# Patient Record
Sex: MALE | Race: WHITE | NOT HISPANIC OR LATINO
[De-identification: names, ages, dates, MRNs, and addresses within clinical notes are randomized per-mention and may not be internally consistent; named-entity substitution may affect disease eponyms.]

---

## 2019-06-23 ENCOUNTER — TRANSCRIPTION ENCOUNTER (OUTPATIENT)
Age: 39
End: 2019-06-23

## 2019-10-09 ENCOUNTER — APPOINTMENT (OUTPATIENT)
Dept: PULMONOLOGY | Facility: CLINIC | Age: 39
End: 2019-10-09
Payer: COMMERCIAL

## 2019-10-09 VITALS — WEIGHT: 214 LBS | BODY MASS INDEX: 29.96 KG/M2 | HEIGHT: 71.06 IN

## 2019-10-09 VITALS — OXYGEN SATURATION: 98 % | HEART RATE: 71 BPM | SYSTOLIC BLOOD PRESSURE: 112 MMHG | DIASTOLIC BLOOD PRESSURE: 76 MMHG

## 2019-10-09 DIAGNOSIS — Z80.9 FAMILY HISTORY OF MALIGNANT NEOPLASM, UNSPECIFIED: ICD-10-CM

## 2019-10-09 DIAGNOSIS — Z87.891 PERSONAL HISTORY OF NICOTINE DEPENDENCE: ICD-10-CM

## 2019-10-09 PROBLEM — Z00.00 ENCOUNTER FOR PREVENTIVE HEALTH EXAMINATION: Status: ACTIVE | Noted: 2019-10-09

## 2019-10-09 PROCEDURE — 99205 OFFICE O/P NEW HI 60 MIN: CPT

## 2019-10-09 NOTE — HISTORY OF PRESENT ILLNESS
[Snoring] : snoring [Awakes Unrefreshed] : awakening unrefreshed [Recent  Weight Gain] : recent weight gain [To Bed: ___] : ~he/she~ goes to bed at [unfilled] [Arises: ___] : arises at [unfilled] [Sleep Onset Latency: ___ minutes] : sleep onset latency of [unfilled] minutes reported [Nocturnal Awakenings: ___] : ~he/she~ typically has [unfilled] nocturnal awakenings [Witnessed Apneas] : no witnessed sleep apnea [Frequent Nocturnal Awakening] : no nocturnal awakening [Awakes with Headache] : no headache upon awakening [Awakening With Dry Mouth] : no dry mouth upon awakening [Unusual Sleep Behavior] : no unusual sleep behavior [Hypersomnolence] : no hypersomnolence [Cataplexy] : no cataplexy [Sleep Paralysis] : no sleep paralysis [Hypnagogic Hallucinations] : no hallucinations when falling asleep [Hypnopompic Hallucinations] : no hallucinations when awakening [FreeTextEntry1] : 38-year-old male with no significant past medical history except for childhood reflux seen today for recent findings on CAT scan following a protracted episode of neck pain. Patient states that he developed acute onset neck pain located in his posterior pharynx Approximately 3 months ago. He patient was initially treated with nonsteroidals with gradual improvement and resolution.\par \par Hx neg for fever,chills, N/V night sweats or wt loss. Mild snoring without apneas or EDS\par

## 2019-10-09 NOTE — CONSULT LETTER
[Dear  ___] : Dear  [unfilled], [Consult Letter:] : I had the pleasure of evaluating your patient, [unfilled]. [Please see my note below.] : Please see my note below. [Sincerely,] : Sincerely, [FreeTextEntry3] : Jose Muse MD FCCP\par Pulmonary/Critical Care/Sleep Medicine\par Department of Internal Medicine\par \par [facility]\par

## 2019-10-09 NOTE — PHYSICAL EXAM
[General Appearance - Well Developed] : well developed [Normal Appearance] : normal appearance [Well Groomed] : well groomed [No Deformities] : no deformities [General Appearance - Well Nourished] : well nourished [General Appearance - In No Acute Distress] : no acute distress [Normal Conjunctiva] : the conjunctiva exhibited no abnormalities [Eyelids - No Xanthelasma] : the eyelids demonstrated no xanthelasmas [Normal Oropharynx] : normal oropharynx [Neck Appearance] : the appearance of the neck was normal [Neck Cervical Mass (___cm)] : no neck mass was observed [Jugular Venous Distention Increased] : there was no jugular-venous distention [Thyroid Nodule] : there were no palpable thyroid nodules [Thyroid Diffuse Enlargement] : the thyroid was not enlarged [Heart Rate And Rhythm] : heart rate and rhythm were normal [Heart Sounds] : normal S1 and S2 [Murmurs] : no murmurs present [Respiration, Rhythm And Depth] : normal respiratory rhythm and effort [Exaggerated Use Of Accessory Muscles For Inspiration] : no accessory muscle use [Auscultation Breath Sounds / Voice Sounds] : lungs were clear to auscultation bilaterally [Abdomen Soft] : soft [Abdomen Tenderness] : non-tender [Abdomen Mass (___ Cm)] : no abdominal mass palpated [Abnormal Walk] : normal gait [Gait - Sufficient For Exercise Testing] : the gait was sufficient for exercise testing [Nail Clubbing] : no clubbing of the fingernails [Cyanosis, Localized] : no localized cyanosis [Petechial Hemorrhages (___cm)] : no petechial hemorrhages [Skin Color & Pigmentation] : normal skin color and pigmentation [Skin Turgor] : normal skin turgor [] : no rash [Deep Tendon Reflexes (DTR)] : deep tendon reflexes were 2+ and symmetric [Sensation] : the sensory exam was normal to light touch and pinprick [No Focal Deficits] : no focal deficits

## 2019-10-09 NOTE — PROCEDURE
[FreeTextEntry1] : Neck and chest CTs without contrast were performed at [facility] on 10/3 and 10/4 2019. These were reviewed on PACS with the patient and his wife. These demonstrated prominent mediastinal nodes with several sub-centimeter pulmonary nodules located on the right. Largest mediastinal nodes was read 3.0 x 1.4 x 1.7 cm.

## 2019-10-10 LAB
ALBUMIN SERPL ELPH-MCNC: 4.8 G/DL
ALP BLD-CCNC: 42 U/L
ALT SERPL-CCNC: 45 U/L
ANION GAP SERPL CALC-SCNC: 15 MMOL/L
AST SERPL-CCNC: 29 U/L
BASOPHILS # BLD AUTO: 0.02 K/UL
BASOPHILS NFR BLD AUTO: 0.3 %
BILIRUB SERPL-MCNC: 0.6 MG/DL
BUN SERPL-MCNC: 17 MG/DL
CALCIUM SERPL-MCNC: 9.8 MG/DL
CHLORIDE SERPL-SCNC: 101 MMOL/L
CO2 SERPL-SCNC: 24 MMOL/L
CREAT SERPL-MCNC: 1.13 MG/DL
EBV EA AB SER IA-ACNC: 20.1 U/ML
EBV EA AB TITR SER IF: POSITIVE
EBV EA IGG SER QL IA: 269 U/ML
EBV EA IGG SER-ACNC: POSITIVE
EBV EA IGM SER IA-ACNC: NEGATIVE
EBV PATRN SPEC IB-IMP: NORMAL
EBV VCA IGG SER IA-ACNC: 732 U/ML
EBV VCA IGM SER QL IA: <10 U/ML
EOSINOPHIL # BLD AUTO: 0.15 K/UL
EOSINOPHIL NFR BLD AUTO: 2.3 %
EPSTEIN-BARR VIRUS CAPSID ANTIGEN IGG: POSITIVE
GLUCOSE SERPL-MCNC: 82 MG/DL
HCT VFR BLD CALC: 49 %
HGB BLD-MCNC: 16 G/DL
IMM GRANULOCYTES NFR BLD AUTO: 0.3 %
LYMPHOCYTES # BLD AUTO: 0.88 K/UL
LYMPHOCYTES NFR BLD AUTO: 13.5 %
MAN DIFF?: NORMAL
MCHC RBC-ENTMCNC: 29.7 PG
MCHC RBC-ENTMCNC: 32.7 GM/DL
MCV RBC AUTO: 91.1 FL
MONOCYTES # BLD AUTO: 0.63 K/UL
MONOCYTES NFR BLD AUTO: 9.7 %
NEUTROPHILS # BLD AUTO: 4.8 K/UL
NEUTROPHILS NFR BLD AUTO: 73.9 %
PLATELET # BLD AUTO: 251 K/UL
POTASSIUM SERPL-SCNC: 4.5 MMOL/L
PROT SERPL-MCNC: 7.5 G/DL
RBC # BLD: 5.38 M/UL
RBC # FLD: 12.4 %
SODIUM SERPL-SCNC: 140 MMOL/L
WBC # FLD AUTO: 6.5 K/UL

## 2019-10-11 LAB — HETEROPH AB SER QL: NEGATIVE

## 2019-10-15 LAB — ACE BLD-CCNC: 59 U/L

## 2019-12-23 ENCOUNTER — APPOINTMENT (OUTPATIENT)
Dept: PULMONOLOGY | Facility: CLINIC | Age: 39
End: 2019-12-23

## 2020-03-04 ENCOUNTER — APPOINTMENT (OUTPATIENT)
Dept: PULMONOLOGY | Facility: CLINIC | Age: 40
End: 2020-03-04
Payer: COMMERCIAL

## 2020-03-04 VITALS — HEIGHT: 70 IN | BODY MASS INDEX: 29.78 KG/M2 | WEIGHT: 208 LBS

## 2020-03-04 VITALS
HEART RATE: 72 BPM | DIASTOLIC BLOOD PRESSURE: 68 MMHG | OXYGEN SATURATION: 98 % | SYSTOLIC BLOOD PRESSURE: 122 MMHG | RESPIRATION RATE: 14 BRPM

## 2020-03-04 PROCEDURE — 85018 HEMOGLOBIN: CPT | Mod: QW

## 2020-03-04 PROCEDURE — 94727 GAS DIL/WSHOT DETER LNG VOL: CPT

## 2020-03-04 PROCEDURE — 99215 OFFICE O/P EST HI 40 MIN: CPT | Mod: 25

## 2020-03-04 PROCEDURE — 94010 BREATHING CAPACITY TEST: CPT

## 2020-03-04 PROCEDURE — 94729 DIFFUSING CAPACITY: CPT

## 2020-03-04 NOTE — DISCUSSION/SUMMARY
[FreeTextEntry1] : 39-year-old male, seen today for mediastinal and hilar adenopathy. No significant change or symptoms. Possibly stage I sarcoid versus Julissa-Barr virus infection versus lymphoma. Case discussed with the patient's wife, as well as the patient and he will be referred to thoracic surgery for mediastinoscopy.

## 2020-03-04 NOTE — PHYSICAL EXAM
[No Acute Distress] : no acute distress [Normal Appearance] : normal appearance [No Neck Mass] : no neck mass [Normal Oropharynx] : normal oropharynx [Normal Rate/Rhythm] : normal rate/rhythm [No Murmurs] : no murmurs [Normal S1, S2] : normal s1, s2 [No Abnormalities] : no abnormalities [Clear to Auscultation Bilaterally] : clear to auscultation bilaterally [No Resp Distress] : no resp distress [Benign] : benign [No Clubbing] : no clubbing [Normal Gait] : normal gait [No Edema] : no edema [No Cyanosis] : no cyanosis [FROM] : FROM [Normal Color/ Pigmentation] : normal color/ pigmentation [Normal Affect] : normal affect [No Focal Deficits] : no focal deficits [Oriented x3] : oriented x3

## 2020-03-04 NOTE — HISTORY OF PRESENT ILLNESS
[TextBox_4] : 39-year-old male, seen today for followup of mediastinal and hilar adenopathy. Patient denies any complaints of cough, wheeze, shortness of breath, chest pains, palpitations, lightheadedness, dizziness. He has noticed some posterior neck pain without any associated masses. On his last visit here. He was found to have elevated IgG Julissa-Barr virus titers with a negative IgM. 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 He is seen today for followup of his adenopathy

## 2020-03-04 NOTE — CONSULT LETTER
[Dear  ___] : Dear  [unfilled], [Please see my note below.] : Please see my note below. [Consult Letter:] : I had the pleasure of evaluating your patient, [unfilled]. [Sincerely,] : Sincerely, [FreeTextEntry3] : Jose Muse MD FCCP\par Pulmonary/Critical Care/Sleep Medicine\par Department of Internal Medicine\par \par [facility]\par

## 2020-03-23 ENCOUNTER — APPOINTMENT (OUTPATIENT)
Dept: THORACIC SURGERY | Facility: CLINIC | Age: 40
End: 2020-03-23

## 2020-05-19 ENCOUNTER — APPOINTMENT (OUTPATIENT)
Dept: PULMONOLOGY | Facility: CLINIC | Age: 40
End: 2020-05-19

## 2020-09-01 ENCOUNTER — APPOINTMENT (OUTPATIENT)
Dept: THORACIC SURGERY | Facility: CLINIC | Age: 40
End: 2020-09-01
Payer: COMMERCIAL

## 2020-09-01 VITALS
RESPIRATION RATE: 15 BRPM | TEMPERATURE: 98.3 F | WEIGHT: 208 LBS | DIASTOLIC BLOOD PRESSURE: 73 MMHG | OXYGEN SATURATION: 97 % | BODY MASS INDEX: 29.78 KG/M2 | HEIGHT: 70 IN | SYSTOLIC BLOOD PRESSURE: 121 MMHG | HEART RATE: 60 BPM

## 2020-09-01 PROCEDURE — 99203 OFFICE O/P NEW LOW 30 MIN: CPT

## 2020-09-01 NOTE — ASSESSMENT
[FreeTextEntry1] : Ryan is a 39-year-old male with adenopathy in the mediastinum and small lung nodules suggestive of sarcoidosis. I do believe biopsy is appropriate for confirmation. I've asked him to obtain a PET scan prior to proceeding with bronchoscopy and endobronchial ultrasound.\par \par Thank you for allowing me to participate in the care of your patient.\par \par Bharath Mcknight MD\[facility] Department of Cardiovascular and Thoracic Surgery\par \par [employer]\[facility] [employer] [facility] at [facility]\par

## 2020-09-01 NOTE — PHYSICAL EXAM
[General Appearance - Alert] : alert [General Appearance - In No Acute Distress] : in no acute distress [Neck Appearance] : the appearance of the neck was normal [Neck Cervical Mass (___cm)] : no neck mass was observed [Jugular Venous Distention Increased] : there was no jugular-venous distention [Thyroid Diffuse Enlargement] : the thyroid was not enlarged [Thyroid Nodule] : there were no palpable thyroid nodules [] : no respiratory distress [Auscultation Breath Sounds / Voice Sounds] : lungs were clear to auscultation bilaterally [Heart Rate And Rhythm] : heart rate was normal and rhythm regular [Heart Sounds] : normal S1 and S2 [Heart Sounds Gallop] : no gallops [Murmurs] : no murmurs [Heart Sounds Pericardial Friction Rub] : no pericardial rub [Examination Of The Chest] : the chest was normal in appearance [Chest Visual Inspection Thoracic Asymmetry] : no chest asymmetry [Diminished Respiratory Excursion] : normal chest expansion [No Spinal Tenderness] : no spinal tenderness [No Focal Deficits] : no focal deficits [Oriented To Time, Place, And Person] : oriented to person, place, and time [Impaired Insight] : insight and judgment were intact [Affect] : the affect was normal

## 2020-09-01 NOTE — HISTORY OF PRESENT ILLNESS
[FreeTextEntry1] : \gabrielle Davis was in the office today for an initial visit. He has a history of lymphadenopathy in the mediastinum and neck in association with an Julissa-Barr infection. A recent CT scan demonstrated persistent adenopathy in the mediastinum and hilar regions in association with small lung nodules. He denies fever, chills, night sweats, weight loss, or weight gain.

## 2020-09-22 ENCOUNTER — APPOINTMENT (OUTPATIENT)
Dept: THORACIC SURGERY | Facility: CLINIC | Age: 40
End: 2020-09-22

## 2020-09-24 ENCOUNTER — APPOINTMENT (OUTPATIENT)
Dept: THORACIC SURGERY | Facility: HOSPITAL | Age: 40
End: 2020-09-24

## 2020-10-12 LAB
BASOPHILS # BLD AUTO: 0.02 K/UL
BASOPHILS NFR BLD AUTO: 0.3 %
EOSINOPHIL # BLD AUTO: 0.22 K/UL
EOSINOPHIL NFR BLD AUTO: 3.6 %
HCT VFR BLD CALC: 46.7 %
HGB BLD-MCNC: 15.2 G/DL
IMM GRANULOCYTES NFR BLD AUTO: 0.2 %
INR PPP: 0.98 RATIO
LYMPHOCYTES # BLD AUTO: 1.09 K/UL
LYMPHOCYTES NFR BLD AUTO: 17.7 %
MAN DIFF?: NORMAL
MCHC RBC-ENTMCNC: 29.7 PG
MCHC RBC-ENTMCNC: 32.5 GM/DL
MCV RBC AUTO: 91.2 FL
MONOCYTES # BLD AUTO: 0.69 K/UL
MONOCYTES NFR BLD AUTO: 11.2 %
NEUTROPHILS # BLD AUTO: 4.14 K/UL
NEUTROPHILS NFR BLD AUTO: 67 %
PLATELET # BLD AUTO: 237 K/UL
PT BLD: 11.6 SEC
RBC # BLD: 5.12 M/UL
RBC # FLD: 12.7 %
SARS-COV-2 N GENE NPH QL NAA+PROBE: NOT DETECTED
WBC # FLD AUTO: 6.17 K/UL

## 2020-10-13 ENCOUNTER — RESULT REVIEW (OUTPATIENT)
Age: 40
End: 2020-10-13

## 2020-10-13 ENCOUNTER — OUTPATIENT (OUTPATIENT)
Dept: OUTPATIENT SERVICES | Facility: HOSPITAL | Age: 40
LOS: 1 days | End: 2020-10-13
Payer: COMMERCIAL

## 2020-10-13 ENCOUNTER — APPOINTMENT (OUTPATIENT)
Dept: INTERVENTIONAL RADIOLOGY/VASCULAR | Facility: CLINIC | Age: 40
End: 2020-10-13

## 2020-10-13 ENCOUNTER — LABORATORY RESULT (OUTPATIENT)
Age: 40
End: 2020-10-13

## 2020-10-13 DIAGNOSIS — Z00.8 ENCOUNTER FOR OTHER GENERAL EXAMINATION: ICD-10-CM

## 2020-10-13 PROCEDURE — 38505 NEEDLE BIOPSY LYMPH NODES: CPT

## 2020-10-13 PROCEDURE — 76942 ECHO GUIDE FOR BIOPSY: CPT | Mod: 26

## 2020-10-26 ENCOUNTER — TRANSCRIPTION ENCOUNTER (OUTPATIENT)
Age: 40
End: 2020-10-26

## 2020-11-02 ENCOUNTER — APPOINTMENT (OUTPATIENT)
Dept: THORACIC SURGERY | Facility: CLINIC | Age: 40
End: 2020-11-02
Payer: COMMERCIAL

## 2020-11-02 VITALS
TEMPERATURE: 98.2 F | DIASTOLIC BLOOD PRESSURE: 89 MMHG | WEIGHT: 210 LBS | HEART RATE: 70 BPM | SYSTOLIC BLOOD PRESSURE: 136 MMHG | BODY MASS INDEX: 29.4 KG/M2 | OXYGEN SATURATION: 97 % | HEIGHT: 71 IN | RESPIRATION RATE: 18 BRPM

## 2020-11-02 PROCEDURE — 99214 OFFICE O/P EST MOD 30 MIN: CPT

## 2020-11-02 PROCEDURE — 99072 ADDL SUPL MATRL&STAF TM PHE: CPT

## 2020-11-02 NOTE — ASSESSMENT
[FreeTextEntry1] : Ryan is a 40-year-old male with adenopathy, demonstrating noncaseating granulomas suggestive of sarcoidosis. I've asked him to return for a pulmonary followup for further recommendations. He can see me on a p.r.n. basis going forward.\par \par Thank you for allowing me to participate in the care of your patient.\par \par Bharath Mcknight MD\[facility] Department of Cardiovascular and Thoracic Surgery\par \[facility] [employer]\[facility] at [facility]\par 
no

## 2020-11-02 NOTE — PHYSICAL EXAM
[Neck Appearance] : the appearance of the neck was normal [Neck Cervical Mass (___cm)] : no neck mass was observed [Jugular Venous Distention Increased] : there was no jugular-venous distention [Thyroid Diffuse Enlargement] : the thyroid was not enlarged [Thyroid Nodule] : there were no palpable thyroid nodules [] : no respiratory distress [Auscultation Breath Sounds / Voice Sounds] : lungs were clear to auscultation bilaterally [Heart Rate And Rhythm] : heart rate was normal and rhythm regular [Heart Sounds] : normal S1 and S2 [Heart Sounds Gallop] : no gallops [Murmurs] : no murmurs [Heart Sounds Pericardial Friction Rub] : no pericardial rub [Examination Of The Chest] : the chest was normal in appearance [Chest Visual Inspection Thoracic Asymmetry] : no chest asymmetry [Diminished Respiratory Excursion] : normal chest expansion [No Focal Deficits] : no focal deficits [Oriented To Time, Place, And Person] : oriented to person, place, and time [Impaired Insight] : insight and judgment were intact [Affect] : the affect was normal

## 2020-11-02 NOTE — HISTORY OF PRESENT ILLNESS
[FreeTextEntry1] : \gabrielle Davis was in the office today for a followup visit. He could initially presented with mediastinal adenopathy concerning for sarcoidosis. A PET scan was performed that showed a hypermetabolic groin lymph node and lengthy discussion was had regarding which site. The biopsy. He and his wife chose to proceed with the groin as the initial biopsy site. This demonstrated noncaseating granulomas suggestive of sarcoidosis.

## 2022-01-21 ENCOUNTER — NON-APPOINTMENT (OUTPATIENT)
Age: 42
End: 2022-01-21

## 2022-02-02 ENCOUNTER — APPOINTMENT (OUTPATIENT)
Dept: PULMONOLOGY | Facility: CLINIC | Age: 42
End: 2022-02-02
Payer: COMMERCIAL

## 2022-02-02 VITALS
SYSTOLIC BLOOD PRESSURE: 124 MMHG | OXYGEN SATURATION: 99 % | TEMPERATURE: 96.6 F | DIASTOLIC BLOOD PRESSURE: 84 MMHG | HEART RATE: 82 BPM | HEIGHT: 71 IN | WEIGHT: 215 LBS | BODY MASS INDEX: 30.1 KG/M2

## 2022-02-02 DIAGNOSIS — D86.9 SARCOIDOSIS, UNSPECIFIED: ICD-10-CM

## 2022-02-02 PROCEDURE — 99215 OFFICE O/P EST HI 40 MIN: CPT

## 2022-02-02 NOTE — DISCUSSION/SUMMARY
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 Patient presently asymptomatic. Recent pharyngitis most likely viral but may be secondary to impacted wisdom teeth. Patient has been advised to follow-up with his dentist as well as routine ophthalmologic evaluation.\par \par No evidence of extra pulmonary or extra lymphatic disease. He has been warned of other sites of involvement such as cardiac, liver, spleen, CNS but again is asymptomatic

## 2022-02-02 NOTE — CONSULT LETTER
[Dear  ___] : Dear  [unfilled], [Please see my note below.] : Please see my note below. [Consult Closing:] : Thank you very much for allowing me to participate in the care of this patient.  If you have any questions, please do not hesitate to contact me. [Sincerely,] : Sincerely, [FreeTextEntry3] : Jose Muse MD FCCP\par Pulmonary/Critical Care/Sleep Medicine\par Department of Internal Medicine\par \par [facility]\par

## 2022-02-02 NOTE — HISTORY OF PRESENT ILLNESS
[TextBox_4] : 41-year-old male with a history of mediastinal and hilar adenopathy last seen in this office nearly 2 years ago.  Patient was found to have an elevated Julissa-Zurita virus with a negative IgM at that time.  He was referred to thoracic surgery where he underwent a PET/CT which demonstrated activity in an inguinal lymph node.  He eventually underwent a biopsy of a left groin lymph node with the findings of noncaseating granuloma.  \par \par Patient has been asymptomatic until recently when he developed  a nonsuppurative pharyngitis 3 weeks ago. He was treated by you with a course of steroids with complete resolution of symptoms. Presently he remains asymptomatic

## 2022-02-02 NOTE — CONSULT LETTER
[Dear  ___] : Dear  [unfilled], [Please see my note below.] : Please see my note below. [Consult Closing:] : Thank you very much for allowing me to participate in the care of this patient.  If you have any questions, please do not hesitate to contact me. [Sincerely,] : Sincerely, [FreeTextEntry3] : Jose Muse MD FCCP\par Pulmonary/Critical Care/Sleep Medicine\par Department of Internal Medicine\par \par [facility]\par

## 2023-01-01 ENCOUNTER — NON-APPOINTMENT (OUTPATIENT)
Age: 43
End: 2023-01-01

## 2023-01-02 ENCOUNTER — EMERGENCY (EMERGENCY)
Facility: HOSPITAL | Age: 43
LOS: 0 days | Discharge: HOME | End: 2023-01-02
Attending: EMERGENCY MEDICINE
Payer: COMMERCIAL

## 2023-01-02 VITALS
TEMPERATURE: 98 F | DIASTOLIC BLOOD PRESSURE: 76 MMHG | RESPIRATION RATE: 18 BRPM | HEART RATE: 62 BPM | SYSTOLIC BLOOD PRESSURE: 126 MMHG | OXYGEN SATURATION: 97 %

## 2023-01-02 VITALS
RESPIRATION RATE: 18 BRPM | SYSTOLIC BLOOD PRESSURE: 132 MMHG | OXYGEN SATURATION: 95 % | DIASTOLIC BLOOD PRESSURE: 81 MMHG | TEMPERATURE: 99 F | HEART RATE: 74 BPM | WEIGHT: 210.1 LBS | HEIGHT: 71 IN

## 2023-01-02 DIAGNOSIS — R07.9 CHEST PAIN, UNSPECIFIED: ICD-10-CM

## 2023-01-02 DIAGNOSIS — R59.0 LOCALIZED ENLARGED LYMPH NODES: ICD-10-CM

## 2023-01-02 DIAGNOSIS — R07.89 OTHER CHEST PAIN: ICD-10-CM

## 2023-01-02 DIAGNOSIS — D86.9 SARCOIDOSIS, UNSPECIFIED: ICD-10-CM

## 2023-01-02 DIAGNOSIS — R00.2 PALPITATIONS: ICD-10-CM

## 2023-01-02 LAB
ALBUMIN SERPL ELPH-MCNC: 3.7 G/DL — SIGNIFICANT CHANGE UP (ref 3.3–5)
ALP SERPL-CCNC: 39 U/L — LOW (ref 40–120)
ALT FLD-CCNC: 45 U/L — SIGNIFICANT CHANGE UP (ref 12–78)
ANION GAP SERPL CALC-SCNC: 5 MMOL/L — SIGNIFICANT CHANGE UP (ref 5–17)
AST SERPL-CCNC: 31 U/L — SIGNIFICANT CHANGE UP (ref 15–37)
BASOPHILS # BLD AUTO: 0.04 K/UL — SIGNIFICANT CHANGE UP (ref 0–0.2)
BASOPHILS NFR BLD AUTO: 0.4 % — SIGNIFICANT CHANGE UP (ref 0–2)
BILIRUB SERPL-MCNC: 0.4 MG/DL — SIGNIFICANT CHANGE UP (ref 0.2–1.2)
BUN SERPL-MCNC: 21 MG/DL — SIGNIFICANT CHANGE UP (ref 7–23)
CALCIUM SERPL-MCNC: 9 MG/DL — SIGNIFICANT CHANGE UP (ref 8.5–10.1)
CHLORIDE SERPL-SCNC: 104 MMOL/L — SIGNIFICANT CHANGE UP (ref 96–108)
CO2 SERPL-SCNC: 30 MMOL/L — SIGNIFICANT CHANGE UP (ref 22–31)
CREAT SERPL-MCNC: 1.37 MG/DL — HIGH (ref 0.5–1.3)
D DIMER BLD IA.RAPID-MCNC: 259 NG/ML DDU — HIGH
EGFR: 66 ML/MIN/1.73M2 — SIGNIFICANT CHANGE UP
EOSINOPHIL # BLD AUTO: 0.13 K/UL — SIGNIFICANT CHANGE UP (ref 0–0.5)
EOSINOPHIL NFR BLD AUTO: 1.4 % — SIGNIFICANT CHANGE UP (ref 0–6)
GLUCOSE SERPL-MCNC: 104 MG/DL — HIGH (ref 70–99)
HCT VFR BLD CALC: 45 % — SIGNIFICANT CHANGE UP (ref 39–50)
HGB BLD-MCNC: 15 G/DL — SIGNIFICANT CHANGE UP (ref 13–17)
IMM GRANULOCYTES NFR BLD AUTO: 0.4 % — SIGNIFICANT CHANGE UP (ref 0–0.9)
LYMPHOCYTES # BLD AUTO: 0.95 K/UL — LOW (ref 1–3.3)
LYMPHOCYTES # BLD AUTO: 9.9 % — LOW (ref 13–44)
MCHC RBC-ENTMCNC: 30.4 PG — SIGNIFICANT CHANGE UP (ref 27–34)
MCHC RBC-ENTMCNC: 33.3 GM/DL — SIGNIFICANT CHANGE UP (ref 32–36)
MCV RBC AUTO: 91.1 FL — SIGNIFICANT CHANGE UP (ref 80–100)
MONOCYTES # BLD AUTO: 0.78 K/UL — SIGNIFICANT CHANGE UP (ref 0–0.9)
MONOCYTES NFR BLD AUTO: 8.2 % — SIGNIFICANT CHANGE UP (ref 2–14)
NEUTROPHILS # BLD AUTO: 7.62 K/UL — HIGH (ref 1.8–7.4)
NEUTROPHILS NFR BLD AUTO: 79.7 % — HIGH (ref 43–77)
PLATELET # BLD AUTO: 265 K/UL — SIGNIFICANT CHANGE UP (ref 150–400)
POTASSIUM SERPL-MCNC: 4 MMOL/L — SIGNIFICANT CHANGE UP (ref 3.5–5.3)
POTASSIUM SERPL-SCNC: 4 MMOL/L — SIGNIFICANT CHANGE UP (ref 3.5–5.3)
PROT SERPL-MCNC: 8 GM/DL — SIGNIFICANT CHANGE UP (ref 6–8.3)
RBC # BLD: 4.94 M/UL — SIGNIFICANT CHANGE UP (ref 4.2–5.8)
RBC # FLD: 13.6 % — SIGNIFICANT CHANGE UP (ref 10.3–14.5)
SODIUM SERPL-SCNC: 139 MMOL/L — SIGNIFICANT CHANGE UP (ref 135–145)
TROPONIN I, HIGH SENSITIVITY RESULT: 3.24 NG/L — SIGNIFICANT CHANGE UP
WBC # BLD: 9.56 K/UL — SIGNIFICANT CHANGE UP (ref 3.8–10.5)
WBC # FLD AUTO: 9.56 K/UL — SIGNIFICANT CHANGE UP (ref 3.8–10.5)

## 2023-01-02 PROCEDURE — 36415 COLL VENOUS BLD VENIPUNCTURE: CPT

## 2023-01-02 PROCEDURE — 71045 X-RAY EXAM CHEST 1 VIEW: CPT | Mod: 26

## 2023-01-02 PROCEDURE — 71045 X-RAY EXAM CHEST 1 VIEW: CPT

## 2023-01-02 PROCEDURE — 84484 ASSAY OF TROPONIN QUANT: CPT

## 2023-01-02 PROCEDURE — 93005 ELECTROCARDIOGRAM TRACING: CPT

## 2023-01-02 PROCEDURE — 99285 EMERGENCY DEPT VISIT HI MDM: CPT

## 2023-01-02 PROCEDURE — 85379 FIBRIN DEGRADATION QUANT: CPT

## 2023-01-02 PROCEDURE — 85025 COMPLETE CBC W/AUTO DIFF WBC: CPT

## 2023-01-02 PROCEDURE — 93010 ELECTROCARDIOGRAM REPORT: CPT

## 2023-01-02 PROCEDURE — 80053 COMPREHEN METABOLIC PANEL: CPT

## 2023-01-02 PROCEDURE — 71275 CT ANGIOGRAPHY CHEST: CPT | Mod: 26,MA

## 2023-01-02 PROCEDURE — 71275 CT ANGIOGRAPHY CHEST: CPT | Mod: MA

## 2023-01-02 PROCEDURE — 99285 EMERGENCY DEPT VISIT HI MDM: CPT | Mod: 25

## 2023-01-02 NOTE — ED STATDOCS - NS ED ATTENDING STATEMENT MOD
This was a shared visit with the JOSÉ. I reviewed and verified the documentation and independently performed the documented:

## 2023-01-02 NOTE — ED ADULT NURSE NOTE - CHPI ED NUR CONTEXT2
Good afternoon tono - I saw kelsey valadezky in the clinic today for his preop - in your orders was a recommendation for chest xray. I didn't order this as although risks are exceedingly low the benefits of the test are even lower. His exam is unremarkable, his numbers are normal and he is an otherwise healthy adolescent. If this isn't ok with you please give me a Jaycob Hodge MD  no unknown

## 2023-01-02 NOTE — ED STATDOCS - PROGRESS NOTE DETAILS
pt seen with ER attending for intermittent left sided chest pain over the past several weeks. Today he experienced the pain after working out at the gym which lasted about 45 minutes longer than past episodes that lasted only 15 minutes discussed results with the patient and follow up plan

## 2023-01-02 NOTE — ED STATDOCS - PATIENT PORTAL LINK FT
You can access the FollowMyHealth Patient Portal offered by [facility] by registering at the following website: [email or web address]. By joining [employer]’s FollowMyHealth portal, you will also be able to view your health information using other applications (apps) compatible with our system.

## 2023-01-02 NOTE — ED STATDOCS - ATTENDING APP SHARED VISIT CONTRIBUTION OF CARE
I personally saw the patient with the JOSÉ, and completed the key components of the history and physical exam. I then discussed the management plan with the JOSÉ.

## 2023-01-02 NOTE — ED ADULT TRIAGE NOTE - CHIEF COMPLAINT QUOTE
Pt c/o chest pain x3 weeks but today became constant. Sent to ED from urgent care for 9/10 chest pain that was relieved after 324mg ASA PTA. STAT EKG to be completed.

## 2023-01-02 NOTE — ED STATDOCS - OBJECTIVE STATEMENT
43 y/o male with a PMHx of sarcoidosis presents to the ED c/o intermittent CP x3 weeks. Pt is diaphoretic during episodes. Episodes last about 15 mins then self resolve. Pt went to the gym earlier today and when he got home he had onset of palpitations and sharp CP. Episode lasted 45 mins. Pt was seen at urgent care and was sent to the ED for evaluation. No other complaints at this time. PCP: Dr. Kapoor.

## 2023-01-02 NOTE — ED ADULT NURSE NOTE - OBJECTIVE STATEMENT
Pt comes to the ED complaining of chest pain. Pt states that he has been having chest pain intermittent for the past 3 weeks. Pt states that today the pain lasted for about 45 minutes, he went to urgent care where he was given 324mg of ASA and was sent to the ED via ambulance. Pt states that on the way here the pain subsided and that he currently does not have any pain.

## 2023-01-04 ENCOUNTER — APPOINTMENT (OUTPATIENT)
Dept: PULMONOLOGY | Facility: CLINIC | Age: 43
End: 2023-01-04
Payer: COMMERCIAL

## 2023-01-04 VITALS
RESPIRATION RATE: 17 BRPM | OXYGEN SATURATION: 98 % | BODY MASS INDEX: 29.4 KG/M2 | WEIGHT: 210 LBS | SYSTOLIC BLOOD PRESSURE: 110 MMHG | HEART RATE: 70 BPM | HEIGHT: 71 IN | DIASTOLIC BLOOD PRESSURE: 78 MMHG

## 2023-01-04 DIAGNOSIS — R93.89 ABNORMAL FINDINGS ON DIAGNOSTIC IMAGING OF OTHER SPECIFIED BODY STRUCTURES: ICD-10-CM

## 2023-01-04 DIAGNOSIS — Z09 ENCOUNTER FOR FOLLOW-UP EXAMINATION AFTER COMPLETED TREATMENT FOR CONDITIONS OTHER THAN MALIGNANT NEOPLASM: ICD-10-CM

## 2023-01-04 DIAGNOSIS — R07.89 OTHER CHEST PAIN: ICD-10-CM

## 2023-01-04 PROCEDURE — 99496 TRANSJ CARE MGMT HIGH F2F 7D: CPT

## 2023-01-04 NOTE — END OF VISIT
[FreeTextEntry3] : Hospitalization reviewed including PACS [Time Spent: ___ minutes] : I have spent [unfilled] minutes of time on the encounter.

## 2023-01-04 NOTE — CONSULT LETTER
[Dear  ___] : Dear  [unfilled], [Consult Letter:] : I had the pleasure of evaluating your patient, [unfilled]. [Please see my note below.] : Please see my note below. [Sincerely,] : Sincerely, [FreeTextEntry3] : Jose Muse MD FCCP\par Pulmonary/Critical Care/Sleep Medicine\par Department of Internal Medicine\par \par [facility]\par

## 2023-01-04 NOTE — HISTORY OF PRESENT ILLNESS
[TextBox_4] : 42-year-old male with a previous history of sarcoidosis seen today status post recent ER visit at [facility] 2 days ago.  Patient presented with 3 weeks of atypical chest pain.  This was complicated by episodes of palpitations as well as sharp chest pain.  He denied any significant shortness of breath.  Labs performed at that time showed a normal CBC except for very slight elevation in neutrophil count.  Chemistries demonstrated an elevated creatinine of 1.37, D-dimer of 259.  Chest CT with PE protocol showed no evidence of pulmonary emboli or infiltrates.  Symmetrical enlarged bilateral mediastinal nodes were seen with the largest seen at 2 cm in the short axis in the right hilum previously 1.2 cm.\par \par Patient was last seen in this office in February 2022 and felt to have stable stage I sarcoidosis following EBV infection.  A follow-up CAT scan with contrast was ordered as of January of this year which was completed as above.\par \par Patient states that he has been having increased reflux over the last several weeks.  With the holidays he has been drinking more and noted worsening heartburn.  No history of fevers chills or other myalgias or arthralgias.\par \par The above pain did present itself while the patient was working out.  But the patient was able to exercise through it

## 2023-01-04 NOTE — REASON FOR VISIT
[Follow-Up - From Hospitalization] : a follow-up visit after a recent hospitalization [Sarcoidosis] : sarcoidosis [Spouse] : spouse

## 2023-01-04 NOTE — DISCUSSION/SUMMARY
[FreeTextEntry1] : 42-year-old male with presumed stage I sarcoid seen today for atypical chest pain following hospitalization in [facility] emergency room.  Hospitalization reviewed on sunrise and CT reviewed on PACS with patient.  Patient has diagnosis of sarcoidosis with based only on an inguinal node biopsy.  The change in his mediastinal nodes are of somewhat concern and the patient will undergo repeat work-up including PET scan and possible biopsy.  His atypical chest pain still may have a cardiac component versus reflux with esophageal spasm.  He has been referred to the cardiologist and he is being placed on a proton pump inhibitor.  He has been asked to avoid alcohol as well as caffeinated foods.

## 2023-01-30 ENCOUNTER — RX CHANGE (OUTPATIENT)
Age: 43
End: 2023-01-30

## 2023-02-01 PROBLEM — D86.9 SARCOIDOSIS, UNSPECIFIED: Chronic | Status: ACTIVE | Noted: 2023-01-05

## 2023-02-10 ENCOUNTER — APPOINTMENT (OUTPATIENT)
Dept: NUCLEAR MEDICINE | Facility: CLINIC | Age: 43
End: 2023-02-10

## 2023-02-13 ENCOUNTER — APPOINTMENT (OUTPATIENT)
Dept: PULMONOLOGY | Facility: CLINIC | Age: 43
End: 2023-02-13

## 2023-02-23 ENCOUNTER — OUTPATIENT (OUTPATIENT)
Dept: OUTPATIENT SERVICES | Facility: HOSPITAL | Age: 43
LOS: 1 days | End: 2023-02-23

## 2023-02-23 ENCOUNTER — APPOINTMENT (OUTPATIENT)
Dept: NUCLEAR MEDICINE | Facility: CLINIC | Age: 43
End: 2023-02-23
Payer: COMMERCIAL

## 2023-02-23 PROCEDURE — 78815 PET IMAGE W/CT SKULL-THIGH: CPT | Mod: 26,PI

## 2023-02-27 ENCOUNTER — APPOINTMENT (OUTPATIENT)
Dept: PULMONOLOGY | Facility: CLINIC | Age: 43
End: 2023-02-27
Payer: COMMERCIAL

## 2023-02-27 PROCEDURE — 99443: CPT

## 2023-03-06 ENCOUNTER — APPOINTMENT (OUTPATIENT)
Dept: THORACIC SURGERY | Facility: CLINIC | Age: 43
End: 2023-03-06
Payer: COMMERCIAL

## 2023-03-06 VITALS
SYSTOLIC BLOOD PRESSURE: 127 MMHG | TEMPERATURE: 98.3 F | RESPIRATION RATE: 16 BRPM | OXYGEN SATURATION: 98 % | BODY MASS INDEX: 30.1 KG/M2 | HEIGHT: 71 IN | WEIGHT: 215 LBS | DIASTOLIC BLOOD PRESSURE: 85 MMHG | HEART RATE: 62 BPM

## 2023-03-06 DIAGNOSIS — R59.1 GENERALIZED ENLARGED LYMPH NODES: ICD-10-CM

## 2023-03-06 PROCEDURE — 99214 OFFICE O/P EST MOD 30 MIN: CPT

## 2023-03-06 NOTE — PHYSICAL EXAM
[] : no respiratory distress [Auscultation Breath Sounds / Voice Sounds] : lungs were clear to auscultation bilaterally [Right Carotid Bruit] : no bruit heard over the right carotid [Left Carotid Bruit] : no bruit heard over the left carotid [Right Femoral Bruit] : no bruit heard over the right femoral artery [Left Femoral Bruit] : no bruit heard over the left femoral artery [2+] : left 2+ [No Abnormalities] : the abdominal aorta was not enlarged and no bruit was heard

## 2023-03-06 NOTE — REVIEW OF SYSTEMS
[Negative] : Heme/Lymph [Fever] : no fever [Chills] : no chills [Feeling Poorly] : not feeling poorly [Feeling Tired] : not feeling tired [Chest Pain] : no chest pain [Palpitations] : no palpitations [Lower Ext Edema] : no extremity edema [Shortness Of Breath] : no shortness of breath [Wheezing] : no wheezing [Cough] : no cough [SOB on Exertion] : no shortness of breath during exertion [Sleep Disturbances] : no sleep disturbances [Anxiety] : no anxiety [Depression] : no depression

## 2023-03-06 NOTE — DATA REVIEWED
[FreeTextEntry1] : PETCT [facility] ONC FDG INIT  - ORDERED BY: XIANG HILLMAN\par PROCEDURE DATE:  02/23/2023\par INTERPRETATION:  CLINICAL INFORMATION: Lymphadenopathy, follow-up. Sarcoidosis.\par \par TREATMENT STRATEGY EVALUATION: Initial\par FASTING BLOOD SUGAR: 77 mg/dL\par RADIOPHARMACEUTICAL:  12 mCi F-18, FDG, I.V.\par UPTAKE PERIOD: 55 minutes\par SCANNER: [id number]\par ORAL CONTRAST: Patient drank OMNIPAQUE contrast during the uptake period.\par PHARMACOLOGIC INTERVENTION: None.\par \par TECHNIQUE: Following intravenous injection of radiopharmaceutical and above uptake period, PET/CT was obtained from base of skull  to mid-thigh. CT protocol was optimized for PET attenuation correction and anatomic localization and was not designed to produce and cannot replace state-of-the-art diagnostic CT images with specific imaging protocols for different body parts and indications. Images were reconstructed and reviewed in axial, coronal and sagittal views and three-dimensional MIP.\par \par The standardized uptake values (SUV) are normalized to patient body weight and indicate the highest activity concentration (SUVmax) in a given site. All image numbers refer to axial image number.\par \par COMPARISON:  FDG-PET/CT dated  9/9/2020\par \par OTHER STUDIES USED FOR CORRELATION: CT chest 1/2/2023\par \par FINDINGS:\par \par HEAD/NECK: Physiologic FDG activity in visualized brain. New FDG-avid left cervical lymph nodes. Symmetrical tonsillar FDG uptake.\par \par THORAX: FDG-avid mediastinal and bihilar lymph nodes, increased in extent, size, and FDG avidity since prior exam. Axillary FDG-avid lymph nodes.\par \par LUNGS: No abnormal FDG activity. 0.5 cm right mid lung nodule, image 85 not definitively identified on prior exam.\par \par PLEURA/PERICARDIUM: No abnormal FDG activity. No effusion. Likely tiny subpleural lymph nodes versus peripheral nodules on the right pleura.\par \par HEPATOBILIARY/PANCREAS: Physiologic FDG activity.  For reference, normal liver demonstrates SUV mean 3.1; previously 2.4.\par \par SPLEEN: Physiologic FDG activity. Normal in size.\par \par ADRENAL GLANDS: No abnormal FDG activity. No nodule.\par \par KIDNEYS/URINARY BLADDER: Physiologic excreted FDG activity.\par \par REPRODUCTIVE ORGANS: No abnormal FDG activity.\par \par ABDOMINOPELVIC LYMPH NODES/RETROPERITONEUM: Similar FDG-avid left inguinal lymph node.\par \par ESOPHAGUS/STOMACH/BOWEL/PERITONEUM/MESENTERY: No abnormal FDG activity.\par \par VESSELS: Unremarkable.\par \par BONES/SOFT TISSUES: Degenerative/inflammatory uptake around the right shoulder and bilateral hips.\par \par IMPRESSION:\par \par 1. Interval increase in number and extent of FDG-avid lymphadenopathy, evident in the neck, chest, axillae, and left inguinal region. This could be due to a flare of sarcoidosis. Suggest interval follow-up PET scan after therapy to evaluate for decrease in FDG avidity. Note that these findings can overlap with malignancy and PET cannot distinguish.\par \par --- End of Report ---\par \par \par  CT ANGIO CHEST PULM ART WAWIC\par \par PROCEDURE DATE: 01/02/2023\par \par \par \par INTERPRETATION: ACC: [id number]\par INDICATION, CLINICAL INFORMATION: chest pain elevated d dimer, sarcoidosis\par TECHNIQUE: CT pulmonary angiogram of the chest . Uneventful administration of 90 cc Omnipaque 350. Coronal, sagittal and 3-D/MIP images were reconstructed/reviewed.\par COMPARISON: 2/11/2022 chest CT.\par \par FINDINGS:\par \par PULMONARY VESSELS: No pulmonary embolus. Main pulmonary artery normal in diameter.\par \par HEART AND VASCULATURE: Heart size is normal. No pericardial effusion. No aortic aneurysm or dissection.\par \par LUNGS, AIRWAYS, PLEURA: Patent central airways. Clear lungs. No pleural effusions or pneumothorax.\par \par MEDIASTINUM: Symmetric enlarged bilateral mediastinal hilar lymph nodes are increased from prior exam for reference: 2 cm short axis right hilar lymph node previously 1.2 cm.\par \par UPPER ABDOMEN: Within normal limits.\par \par BONES AND SOFT TISSUES: No aggressive osseous lesion.\par \par LOWER NECK: Within normal limits.\par \par IMPRESSION:\par \par No pulmonary embolus.\par \par Enlarged bilateral mediastinal hilar lymph nodes are increased from prior exam\par \par --- End of Report ---\par \par

## 2023-03-06 NOTE — ASSESSMENT
[FreeTextEntry1] : Ryan is a 42-year-old male with a history of a lymph node biopsy in the past that showed noncaseating granulomas and gave him a diagnosis of sarcoidosis.  A recent CT scan demonstrated several lymph nodes in the mediastinum and hilar regions that have increased in size and have activity by PET scan.  I will be arranging a bronchoscopy and endobronchial ultrasound in the near future to obtain tissue.\par \par Thank you for allowing me to participate in the care of your patient.\par \par 30 minutes was spent during this encounter.\par \par Bharath Mcknight MD\par Department of Cardiovascular and Thoracic Surgery\par \par [employer]\[facility]\par

## 2023-03-06 NOTE — HISTORY OF PRESENT ILLNESS
[FreeTextEntry1] : Mr. ALLEGRA POWELL is a 42 year male who presents today for followup. Previously, he was seen in 2020 for mediastinal lymphadenopathy and noncaseating granulomas suggestive of sarcoidosis. He has since followed up with pulmonary and a recent PET scan showed interval increase in FDG avid lymphadenopathy.\par \par Today, the patient reports that he is feeling well. He denies chest pain, palpitations, shortness of breath, cough, fevers, chills, fatigue, unintentional weight loss or gain, and night sweats.

## 2023-03-22 ENCOUNTER — TRANSCRIPTION ENCOUNTER (OUTPATIENT)
Age: 43
End: 2023-03-22

## 2023-03-23 ENCOUNTER — RESULT REVIEW (OUTPATIENT)
Age: 43
End: 2023-03-23

## 2023-03-23 ENCOUNTER — TRANSCRIPTION ENCOUNTER (OUTPATIENT)
Age: 43
End: 2023-03-23

## 2023-03-23 ENCOUNTER — OUTPATIENT (OUTPATIENT)
Dept: OUTPATIENT SERVICES | Facility: HOSPITAL | Age: 43
LOS: 1 days | End: 2023-03-23
Payer: COMMERCIAL

## 2023-03-23 ENCOUNTER — APPOINTMENT (OUTPATIENT)
Dept: THORACIC SURGERY | Facility: HOSPITAL | Age: 43
End: 2023-03-23

## 2023-03-23 DIAGNOSIS — R59.0 LOCALIZED ENLARGED LYMPH NODES: ICD-10-CM

## 2023-03-23 LAB
B PERT IGG+IGM PNL SER: ABNORMAL
COLOR FLD: ABNORMAL
LYMPHOCYTES # FLD: 5 % — SIGNIFICANT CHANGE UP
MONOS+MACROS # FLD: 3 % — SIGNIFICANT CHANGE UP
NEUTROPHILS-BODY FLUID: 92 % — SIGNIFICANT CHANGE UP
RCV VOL RI: HIGH /UL (ref 0–0)
SPECIMEN SOURCE FLD: SIGNIFICANT CHANGE UP
TOTAL NUCLEATED CELL COUNT, BODY FLUID: 1645 /UL — SIGNIFICANT CHANGE UP

## 2023-03-23 PROCEDURE — 88173 CYTOPATH EVAL FNA REPORT: CPT

## 2023-03-23 PROCEDURE — 88185 FLOWCYTOMETRY/TC ADD-ON: CPT

## 2023-03-23 PROCEDURE — 88172 CYTP DX EVAL FNA 1ST EA SITE: CPT

## 2023-03-23 PROCEDURE — 31652 BRONCH EBUS SAMPLNG 1/2 NODE: CPT

## 2023-03-23 PROCEDURE — 87102 FUNGUS ISOLATION CULTURE: CPT

## 2023-03-23 PROCEDURE — 88189 FLOWCYTOMETRY/READ 16 & >: CPT

## 2023-03-23 PROCEDURE — 87015 SPECIMEN INFECT AGNT CONCNTJ: CPT

## 2023-03-23 PROCEDURE — 31625 BRONCHOSCOPY W/BIOPSY(S): CPT

## 2023-03-23 PROCEDURE — 88312 SPECIAL STAINS GROUP 1: CPT | Mod: 26

## 2023-03-23 PROCEDURE — 88305 TISSUE EXAM BY PATHOLOGIST: CPT | Mod: 26

## 2023-03-23 PROCEDURE — 88173 CYTOPATH EVAL FNA REPORT: CPT | Mod: 26,59

## 2023-03-23 PROCEDURE — C9399: CPT

## 2023-03-23 PROCEDURE — 88312 SPECIAL STAINS GROUP 1: CPT

## 2023-03-23 PROCEDURE — 87075 CULTR BACTERIA EXCEPT BLOOD: CPT

## 2023-03-23 PROCEDURE — 31624 DX BRONCHOSCOPE/LAVAGE: CPT

## 2023-03-23 PROCEDURE — 88172 CYTP DX EVAL FNA 1ST EA SITE: CPT | Mod: 26,59

## 2023-03-23 PROCEDURE — 31623 DX BRONCHOSCOPE/BRUSH: CPT

## 2023-03-23 PROCEDURE — 87205 SMEAR GRAM STAIN: CPT

## 2023-03-23 PROCEDURE — 87070 CULTURE OTHR SPECIMN AEROBIC: CPT

## 2023-03-23 PROCEDURE — 89051 BODY FLUID CELL COUNT: CPT

## 2023-03-23 PROCEDURE — 87206 SMEAR FLUORESCENT/ACID STAI: CPT

## 2023-03-23 PROCEDURE — 87116 MYCOBACTERIA CULTURE: CPT

## 2023-03-23 PROCEDURE — 88305 TISSUE EXAM BY PATHOLOGIST: CPT

## 2023-03-23 PROCEDURE — 88104 CYTOPATH FL NONGYN SMEARS: CPT

## 2023-03-23 PROCEDURE — 88104 CYTOPATH FL NONGYN SMEARS: CPT | Mod: 26

## 2023-03-23 PROCEDURE — 31653 BRONCH EBUS SAMPLNG 3/> NODE: CPT

## 2023-03-23 NOTE — BRIEF OPERATIVE NOTE - NSICDXBRIEFPROCEDURE_GEN_ALL_CORE_FT
PROCEDURES:  Bronchoscopy, flexible, adult 23-Mar-2023 12:53:13  Andreina Rubio  Intraoperative endobronchial ultrasound 23-Mar-2023 12:53:57 Biopsy, Brushing, BAL Andreina Rubio

## 2023-03-24 LAB
GRAM STN FLD: SIGNIFICANT CHANGE UP
NIGHT BLUE STAIN TISS: SIGNIFICANT CHANGE UP
SPECIMEN SOURCE: SIGNIFICANT CHANGE UP
SPECIMEN SOURCE: SIGNIFICANT CHANGE UP

## 2023-03-27 LAB — TM INTERPRETATION: SIGNIFICANT CHANGE UP

## 2023-03-28 LAB
CULTURE RESULTS: SIGNIFICANT CHANGE UP
SPECIMEN SOURCE: SIGNIFICANT CHANGE UP

## 2023-03-30 LAB — NON-GYNECOLOGICAL CYTOLOGY STUDY: SIGNIFICANT CHANGE UP

## 2023-04-03 ENCOUNTER — APPOINTMENT (OUTPATIENT)
Dept: THORACIC SURGERY | Facility: CLINIC | Age: 43
End: 2023-04-03
Payer: COMMERCIAL

## 2023-04-03 VITALS
DIASTOLIC BLOOD PRESSURE: 84 MMHG | HEIGHT: 71 IN | RESPIRATION RATE: 15 BRPM | SYSTOLIC BLOOD PRESSURE: 137 MMHG | OXYGEN SATURATION: 99 % | TEMPERATURE: 98.4 F | WEIGHT: 210 LBS | HEART RATE: 81 BPM | BODY MASS INDEX: 29.4 KG/M2

## 2023-04-03 DIAGNOSIS — Z78.9 OTHER SPECIFIED HEALTH STATUS: ICD-10-CM

## 2023-04-03 PROCEDURE — 99214 OFFICE O/P EST MOD 30 MIN: CPT

## 2023-04-03 NOTE — CONSULT LETTER
[Dear  ___] : Dear  [unfilled], [Courtesy Letter:] : I had the pleasure of seeing your patient, [unfilled], in my office today. [Please see my note below.] : Please see my note below. [Referral Closing:] : Thank you very much for seeing this patient.  If you have any questions, please do not hesitate to contact me. [Sincerely,] : Sincerely, [FreeTextEntry2] : Dr. Zac Muse [FreeTextEntry3] : Bharath Mcknight MD\par Department of Cardiovascular and Thoracic Surgery \par  \par Cardiovascular & Thoracic Surgery\par [facility]\par \par [facility]\par [address] [place] [address]\par [place], [place] [address]\par [phone] Tel\par [phone] Fax\par

## 2023-04-03 NOTE — ASSESSMENT
[FreeTextEntry1] : Ryan is a 42-year-old male with a history of adenopathy and a previous biopsy of the groin showing noncaseating granulomas consistent with sarcoidosis.  He recently underwent an imaging study that demonstrated persistent nodes in the mediastinum and endobronchial ultrasound was performed.  This also showed noncaseating granulomas.  I have asked him to return to pulmonary for follow-up regarding further recommendations.\par \par Thank you for allowing me to participate in the care of your patient.\par \par 30 minutes was spent during this encounter.\par \par Bharath Mcknight MD\par Department of Cardiovascular and Thoracic Surgery\par \par [employer]\par [facility] at [facility]\par

## 2023-04-03 NOTE — REASON FOR VISIT
[Follow-Up: _____] : a [unfilled] follow-up visit [FreeTextEntry1] : Flexible bronchoscopy with bronchial lavage, endobronchial brushing and biopsy, endobronchial ultrasound with transbronchial needle aspiration.\par Flexible bronchoscopy with bronchial lavage, endobronchial brushing and biopsy, endobronchial ultrasound with transbronchial needle aspiration.\par Flexible Bronchoscopy with bronchial lavage, endobronchial brushing and biopsy, endobronchial ultrasound transbronchial needle aspiration on 03/23/2023

## 2023-04-03 NOTE — HISTORY OF PRESENT ILLNESS
[FreeTextEntry1] : Mr. ALLEGRA POWELL is a 42 year male who presents today for followup, flexible bronchoscopy with bronchial lavage, endobronchial brushing and biopsy, endobronchial ultrasound with transbronchial needle aspiration. Final pathology negative for micro organism. Previously, he was seen in 2020 for mediastinal lymphadenopathy and noncaseating granulomas suggestive of sarcoidosis. He has since followed up with pulmonary and a recent PET scan showed interval increase in FDG avid lymphadenopathy.\par \par \par

## 2023-04-22 LAB
CULTURE RESULTS: SIGNIFICANT CHANGE UP
SPECIMEN SOURCE: SIGNIFICANT CHANGE UP

## 2023-05-10 LAB
CULTURE RESULTS: SIGNIFICANT CHANGE UP
SPECIMEN SOURCE: SIGNIFICANT CHANGE UP

## 2023-09-05 RX ORDER — OMEPRAZOLE 40 MG/1
40 CAPSULE, DELAYED RELEASE ORAL
Qty: 90 | Refills: 1 | Status: ACTIVE | COMMUNITY
Start: 2023-01-04 | End: 1900-01-01

## 2023-10-10 ENCOUNTER — RX RENEWAL (OUTPATIENT)
Age: 43
End: 2023-10-10

## 2023-12-04 ENCOUNTER — APPOINTMENT (OUTPATIENT)
Dept: PEDIATRIC ALLERGY IMMUNOLOGY | Facility: CLINIC | Age: 43
End: 2023-12-04
Payer: COMMERCIAL

## 2023-12-04 VITALS
TEMPERATURE: 98 F | HEIGHT: 70 IN | BODY MASS INDEX: 30.78 KG/M2 | OXYGEN SATURATION: 100 % | WEIGHT: 215 LBS | DIASTOLIC BLOOD PRESSURE: 80 MMHG | SYSTOLIC BLOOD PRESSURE: 130 MMHG | HEART RATE: 72 BPM

## 2023-12-04 DIAGNOSIS — J30.9 ALLERGIC RHINITIS, UNSPECIFIED: ICD-10-CM

## 2023-12-04 PROCEDURE — 95044 PATCH/APPLICATION TESTS: CPT

## 2023-12-04 PROCEDURE — 99203 OFFICE O/P NEW LOW 30 MIN: CPT | Mod: 25

## 2023-12-07 ENCOUNTER — APPOINTMENT (OUTPATIENT)
Dept: PEDIATRIC ALLERGY IMMUNOLOGY | Facility: CLINIC | Age: 43
End: 2023-12-07
Payer: COMMERCIAL

## 2023-12-07 VITALS
SYSTOLIC BLOOD PRESSURE: 114 MMHG | TEMPERATURE: 97.9 F | DIASTOLIC BLOOD PRESSURE: 78 MMHG | OXYGEN SATURATION: 97 % | HEART RATE: 76 BPM

## 2023-12-07 DIAGNOSIS — L29.8 OTHER PRURITUS: ICD-10-CM

## 2023-12-07 PROCEDURE — 99212 OFFICE O/P EST SF 10 MIN: CPT

## 2023-12-07 RX ORDER — MUPIROCIN 20 MG/G
2 OINTMENT TOPICAL TWICE DAILY
Qty: 2 | Refills: 5 | Status: ACTIVE | COMMUNITY
Start: 2023-12-07 | End: 1900-01-01

## 2023-12-07 RX ORDER — KETOCONAZOLE 20 MG/G
2 CREAM TOPICAL TWICE DAILY
Qty: 1 | Refills: 1 | Status: ACTIVE | COMMUNITY
Start: 2023-12-07 | End: 1900-01-01

## 2023-12-28 ENCOUNTER — NON-APPOINTMENT (OUTPATIENT)
Age: 43
End: 2023-12-28

## 2024-11-18 ENCOUNTER — NON-APPOINTMENT (OUTPATIENT)
Age: 44
End: 2024-11-18

## 2025-05-23 ENCOUNTER — APPOINTMENT (OUTPATIENT)
Dept: ORTHOPEDIC SURGERY | Facility: CLINIC | Age: 45
End: 2025-05-23
Payer: COMMERCIAL

## 2025-05-23 VITALS — WEIGHT: 210 LBS | BODY MASS INDEX: 29.4 KG/M2 | HEIGHT: 71 IN

## 2025-05-23 DIAGNOSIS — S76.311A STRAIN OF MUSCLE, FASCIA AND TENDON OF THE POSTERIOR MUSCLE GROUP AT THIGH LEVEL, RIGHT THIGH, INITIAL ENCOUNTER: ICD-10-CM

## 2025-05-23 DIAGNOSIS — M25.561 PAIN IN RIGHT KNEE: ICD-10-CM

## 2025-05-23 DIAGNOSIS — M79.18 MYALGIA, OTHER SITE: ICD-10-CM

## 2025-05-23 PROCEDURE — 99204 OFFICE O/P NEW MOD 45 MIN: CPT

## 2025-05-28 PROBLEM — S76.311A STRAIN OF RIGHT HAMSTRING, INITIAL ENCOUNTER: Status: ACTIVE | Noted: 2025-05-28

## (undated) DEVICE — SOL IRR POUR H2O 1000ML

## (undated) DEVICE — SOL IRR POUR NS 0.9% 1000ML

## (undated) DEVICE — BALLOON SINGLE FOR BF-UC160F

## (undated) DEVICE — BRUSH CYTO DISP

## (undated) DEVICE — VALVE SUCTION EVIS 160/200/240

## (undated) DEVICE — TRAP SPECIMEN SPUTUM 40CC

## (undated) DEVICE — VENODYNE/SCD SLEEVE CALF MEDIUM

## (undated) DEVICE — STOPCOCK 3 WAY W SWIVEL MALE LUER LOCK

## (undated) DEVICE — ADAPTER BIOPSY VALVE

## (undated) DEVICE — PACK BASIC GOWN MAYO COVER

## (undated) DEVICE — TUBING SUCTION CONN 6FT STERILE

## (undated) DEVICE — SSH-EBUS 1910545: Type: DURABLE MEDICAL EQUIPMENT

## (undated) DEVICE — FORCEP RADIAL JAW 4 PEDIATRIC W NDL 1.8MM 2MM 160CM DISP

## (undated) DEVICE — NDL ENDOBRONCHIAL ULTRASOUND FINE BIOPSY DEVICE 22GA

## (undated) DEVICE — WARMING BLANKET LOWER ADULT

## (undated) DEVICE — DRAPE 3/4 SHEET 52X76"

## (undated) DEVICE — VALVE BIOPSY BRONCHOVIDEOSCOPE

## (undated) DEVICE — NDL ASPIRATION VIZISHOT2 21G

## (undated) DEVICE — GLV 7.5 PROTEXIS (WHITE)

## (undated) DEVICE — SOL INJ NS 0.9% 100ML